# Patient Record
Sex: FEMALE | Race: WHITE
[De-identification: names, ages, dates, MRNs, and addresses within clinical notes are randomized per-mention and may not be internally consistent; named-entity substitution may affect disease eponyms.]

---

## 2018-10-18 ENCOUNTER — HOSPITAL ENCOUNTER (INPATIENT)
Dept: HOSPITAL 76 - ED | Age: 68
LOS: 4 days | Discharge: HOME | DRG: 151 | End: 2018-10-22
Attending: INTERNAL MEDICINE | Admitting: INTERNAL MEDICINE
Payer: MEDICARE

## 2018-10-18 DIAGNOSIS — Z91.138: ICD-10-CM

## 2018-10-18 DIAGNOSIS — Z79.82: ICD-10-CM

## 2018-10-18 DIAGNOSIS — E66.9: ICD-10-CM

## 2018-10-18 DIAGNOSIS — Z79.899: ICD-10-CM

## 2018-10-18 DIAGNOSIS — T46.4X6A: ICD-10-CM

## 2018-10-18 DIAGNOSIS — I10: ICD-10-CM

## 2018-10-18 DIAGNOSIS — R55: ICD-10-CM

## 2018-10-18 DIAGNOSIS — K21.9: ICD-10-CM

## 2018-10-18 DIAGNOSIS — E78.2: ICD-10-CM

## 2018-10-18 DIAGNOSIS — R00.0: ICD-10-CM

## 2018-10-18 DIAGNOSIS — F32.9: ICD-10-CM

## 2018-10-18 DIAGNOSIS — R73.9: ICD-10-CM

## 2018-10-18 DIAGNOSIS — M81.0: ICD-10-CM

## 2018-10-18 DIAGNOSIS — E78.5: ICD-10-CM

## 2018-10-18 DIAGNOSIS — Z87.891: ICD-10-CM

## 2018-10-18 DIAGNOSIS — E87.6: ICD-10-CM

## 2018-10-18 DIAGNOSIS — R04.0: Primary | ICD-10-CM

## 2018-10-18 LAB
ALBUMIN DIAFP-MCNC: 3.5 G/DL (ref 3.2–5.5)
ALBUMIN/GLOB SERPL: 0.9 {RATIO} (ref 1–2.2)
ALP SERPL-CCNC: 97 IU/L (ref 42–121)
ALT SERPL W P-5'-P-CCNC: 21 IU/L (ref 10–60)
ANION GAP SERPL CALCULATED.4IONS-SCNC: 8 MMOL/L (ref 6–13)
AST SERPL W P-5'-P-CCNC: 19 IU/L (ref 10–42)
BASOPHILS NFR BLD AUTO: 0.1 10^3/UL (ref 0–0.1)
BASOPHILS NFR BLD AUTO: 0.1 10^3/UL (ref 0–0.1)
BASOPHILS NFR BLD AUTO: 0.8 %
BASOPHILS NFR BLD AUTO: 0.9 %
BILIRUB BLD-MCNC: 1 MG/DL (ref 0.2–1)
BUN SERPL-MCNC: 30 MG/DL (ref 6–20)
CALCIUM UR-MCNC: 9.3 MG/DL (ref 8.5–10.3)
CHLORIDE SERPL-SCNC: 104 MMOL/L (ref 101–111)
CO2 SERPL-SCNC: 27 MMOL/L (ref 21–32)
CREAT SERPLBLD-SCNC: 0.7 MG/DL (ref 0.4–1)
EOSINOPHIL # BLD AUTO: 0.2 10^3/UL (ref 0–0.7)
EOSINOPHIL # BLD AUTO: 0.2 10^3/UL (ref 0–0.7)
EOSINOPHIL NFR BLD AUTO: 1.4 %
EOSINOPHIL NFR BLD AUTO: 1.7 %
ERYTHROCYTE [DISTWIDTH] IN BLOOD BY AUTOMATED COUNT: 15.3 % (ref 12–15)
ERYTHROCYTE [DISTWIDTH] IN BLOOD BY AUTOMATED COUNT: 15.3 % (ref 12–15)
GFRSERPLBLD MDRD-ARVRAT: 84 ML/MIN/{1.73_M2} (ref 89–?)
GLOBULIN SER-MCNC: 3.8 G/DL (ref 2.1–4.2)
GLUCOSE SERPL-MCNC: 153 MG/DL (ref 70–100)
HGB UR QL STRIP: 12.8 G/DL (ref 12–16)
HGB UR QL STRIP: 13.3 G/DL (ref 12–16)
INR PPP: 1.1 (ref 0.8–1.2)
LIPASE SERPL-CCNC: 23 U/L (ref 22–51)
LYMPHOCYTES # SPEC AUTO: 1.7 10^3/UL (ref 1.5–3.5)
LYMPHOCYTES # SPEC AUTO: 2.5 10^3/UL (ref 1.5–3.5)
LYMPHOCYTES NFR BLD AUTO: 14 %
LYMPHOCYTES NFR BLD AUTO: 18 %
MCH RBC QN AUTO: 28.2 PG (ref 27–31)
MCH RBC QN AUTO: 28.2 PG (ref 27–31)
MCHC RBC AUTO-ENTMCNC: 33.9 G/DL (ref 32–36)
MCHC RBC AUTO-ENTMCNC: 34.3 G/DL (ref 32–36)
MCV RBC AUTO: 82.1 FL (ref 81–99)
MCV RBC AUTO: 83.2 FL (ref 81–99)
MONOCYTES # BLD AUTO: 0.7 10^3/UL (ref 0–1)
MONOCYTES # BLD AUTO: 1.1 10^3/UL (ref 0–1)
MONOCYTES NFR BLD AUTO: 5.5 %
MONOCYTES NFR BLD AUTO: 8.2 %
NEUTROPHILS # BLD AUTO: 10.1 10^3/UL (ref 1.5–6.6)
NEUTROPHILS # BLD AUTO: 9.5 10^3/UL (ref 1.5–6.6)
NEUTROPHILS # SNV AUTO: 12.2 X10^3/UL (ref 4.8–10.8)
NEUTROPHILS # SNV AUTO: 14 X10^3/UL (ref 4.8–10.8)
NEUTROPHILS NFR BLD AUTO: 71.6 %
NEUTROPHILS NFR BLD AUTO: 77.9 %
PDW BLD AUTO: 6.5 FL (ref 7.9–10.8)
PDW BLD AUTO: 6.7 FL (ref 7.9–10.8)
PLATELET # BLD: 263 10^3/UL (ref 130–450)
PLATELET # BLD: 274 10^3/UL (ref 130–450)
PROT SPEC-MCNC: 7.3 G/DL (ref 6.7–8.2)
PROTHROM ACT/NOR PPP: 12.4 SECS (ref 9.9–12.6)
RBC MAR: 4.54 10^6/UL (ref 4.2–5.4)
RBC MAR: 4.72 10^6/UL (ref 4.2–5.4)
SODIUM SERPLBLD-SCNC: 139 MMOL/L (ref 135–145)

## 2018-10-18 PROCEDURE — 83721 ASSAY OF BLOOD LIPOPROTEIN: CPT

## 2018-10-18 PROCEDURE — 83605 ASSAY OF LACTIC ACID: CPT

## 2018-10-18 PROCEDURE — 96365 THER/PROPH/DIAG IV INF INIT: CPT

## 2018-10-18 PROCEDURE — 83036 HEMOGLOBIN GLYCOSYLATED A1C: CPT

## 2018-10-18 PROCEDURE — 80053 COMPREHEN METABOLIC PANEL: CPT

## 2018-10-18 PROCEDURE — 85018 HEMOGLOBIN: CPT

## 2018-10-18 PROCEDURE — 93005 ELECTROCARDIOGRAM TRACING: CPT

## 2018-10-18 PROCEDURE — 85025 COMPLETE CBC W/AUTO DIFF WBC: CPT

## 2018-10-18 PROCEDURE — 99284 EMERGENCY DEPT VISIT MOD MDM: CPT

## 2018-10-18 PROCEDURE — 83880 ASSAY OF NATRIURETIC PEPTIDE: CPT

## 2018-10-18 PROCEDURE — 83735 ASSAY OF MAGNESIUM: CPT

## 2018-10-18 PROCEDURE — 93880 EXTRACRANIAL BILAT STUDY: CPT

## 2018-10-18 PROCEDURE — 83690 ASSAY OF LIPASE: CPT

## 2018-10-18 PROCEDURE — 36415 COLL VENOUS BLD VENIPUNCTURE: CPT

## 2018-10-18 PROCEDURE — 85610 PROTHROMBIN TIME: CPT

## 2018-10-18 PROCEDURE — 85014 HEMATOCRIT: CPT

## 2018-10-18 PROCEDURE — 80061 LIPID PANEL: CPT

## 2018-10-18 PROCEDURE — 85730 THROMBOPLASTIN TIME PARTIAL: CPT

## 2018-10-18 PROCEDURE — 84100 ASSAY OF PHOSPHORUS: CPT

## 2018-10-18 PROCEDURE — 84484 ASSAY OF TROPONIN QUANT: CPT

## 2018-10-18 PROCEDURE — 93306 TTE W/DOPPLER COMPLETE: CPT

## 2018-10-18 PROCEDURE — 96375 TX/PRO/DX INJ NEW DRUG ADDON: CPT

## 2018-10-18 PROCEDURE — 96361 HYDRATE IV INFUSION ADD-ON: CPT

## 2018-10-18 PROCEDURE — 84443 ASSAY THYROID STIM HORMONE: CPT

## 2018-10-18 PROCEDURE — 30901 CONTROL OF NOSEBLEED: CPT

## 2018-10-18 NOTE — ED PHYSICIAN DOCUMENTATION
PD HPI HEENT





- Stated complaint


Stated Complaint: NOSE BLEED





- Chief complaint


Chief Complaint: Heent





- History obtained from


History obtained from: Patient





- History of Present Illness


Timing - onset: Today


Timing - details: Abrupt onset


Location: Nose


Improves: Nothing


Associated symptoms: No: Fever, Trismus, Unable to swallow


Similar symptoms before: No diagnosis


Recently seen: Emergency Dept





- Additional information


Additional information: 


68-year-old female presents the emergency department for evaluation of a 

nosebleed which started earlier today.  The patient has noticed bleeding from 

her left nose and then developed right nose bleeding.  The bleeding had improved

worse to return this evening.  No lightheadedness or dizziness.  No other 

associated symptoms.Symptoms are described as moderate.  No triggering factors. 

No relieving factors








Review of Systems


Constitutional: denies: Fever


Eyes: denies: Discharge


Ears: denies: Ear pain


Nose: reports: Epistaxis


Throat: denies: Sore throat


Cardiac: denies: Chest pain / pressure


Respiratory: denies: Cough


GI: denies: Abdominal Pain


: denies: Dysuria


Musculoskeletal: denies: Neck pain


Neurologic: denies: Generalized weakness


Immunocompromised: denies: Chemotherapy





PD PAST MEDICAL HISTORY





- Past Medical History


Past Medical History: Yes


Cardiovascular: Hypertension, High cholesterol


Respiratory: Asthma


Neuro: None


Endocrine/Autoimmune: None


GI: GERD


GYN: None


: None


HEENT: None


Psych: Depression, Other


Musculoskeletal: Chronic back pain


Derm: Other drug resistant infections





- Past Surgical History


Past Surgical History: Yes


General: Colonoscopy


/GYN: Tubal ligation, Hysterectomy





- Present Medications


Home Medications: 


                                Ambulatory Orders











 Medication  Instructions  Recorded  Confirmed


 


Aspirin 1 tab PO DAILY 10/18/18 10/18/18














- Allergies


Allergies/Adverse Reactions: 


                                    Allergies











Allergy/AdvReac Type Severity Reaction Status Date / Time


 


shrimp Allergy  Unknown Verified 10/19/18 01:35














- Social History


Does the pt smoke?: No


Smoking Status: Never smoker


Does the pt drink ETOH?: Yes


Does the pt have substance abuse?: No





- Immunizations


Immunizations are current?: Yes





- POLST


Patient has POLST: No





PD ED PE NORMAL





- General


General: Alert and oriented X 3, No acute distress





- HEENT


HEENT: Atraumatic, PERRL, EOMI, Ears normal, Other (The patient has a brisk 

bleed out of the left naris which appears to be anterior, there appears to be 

reflux blood into the right naris.  There is blood in the posterior pharynx)





- Cardiac


Cardiac: Other (Regular tachycardia)





- Respiratory


Respiratory: No respiratory distress, Clear bilaterally





- Derm


Derm: Normal color





- Extremities


Extremities: No deformity





- Neuro


Neuro: Alert and oriented X 3, Normal speech





- Psych


Psych: Normal mood





PD ED PE EXPANDED





- HEENT


HEENT: Right nares epsitaxis (The blood in the right nares appears to be reflux 

from the left), Left nares epistaxis





Results





- Vitals


Vitals: 


                               Vital Signs - 24 hr











  10/18/18 10/18/18 10/18/18





  19:22 20:46 22:23


 


Temperature 37.2 C  


 


Heart Rate 125 H 111 H 110 H


 


Respiratory 20 16 22





Rate   


 


Blood Pressure 185/107 H 189/115 H 183/115 H


 


O2 Saturation 96 97 97














  10/18/18 10/18/18





  23:30 23:39


 


Temperature 36.4 C L 


 


Heart Rate 103 H 107 H


 


Respiratory 18 27 H





Rate  


 


Blood Pressure 163/89 H 152/93 H


 


O2 Saturation 96 98








                                     Oxygen











O2 Source                      Room air

















- EKG (time done)


  ** No standard instances


Rate: Rate (enter#) (101)


Rhythm: Sinus tachycardia


Intervals: Normal ID


QRS: Normal


Ischemia: Normal ST segments





- Labs


Labs: 


                                Laboratory Tests











  10/18/18 10/18/18 10/18/18





  19:55 19:55 23:30


 


WBC  12.2 H   14.0 H


 


RBC  4.72   4.54


 


Hgb  13.3   12.8


 


Hct  38.8   37.7


 


MCV  82.1   83.2


 


MCH  28.2   28.2


 


MCHC  34.3   33.9


 


RDW  15.3 H   15.3 H


 


Plt Count  263   274


 


MPV  6.5 L   6.7 L


 


Neut # (Auto)  9.5 H   10.1 H


 


Lymph # (Auto)  1.7   2.5


 


Mono # (Auto)  0.7   1.1 H


 


Eos # (Auto)  0.2   0.2


 


Baso # (Auto)  0.1   0.1


 


Absolute Nucleated RBC  0.00   0.01


 


Nucleated RBC %  0.0   0.0


 


PT   12.4 


 


INR   1.1 


 


APTT   26.7 


 


Sodium   


 


Potassium   


 


Chloride   


 


Carbon Dioxide   


 


Anion Gap   


 


BUN   


 


Creatinine   


 


Estimated GFR (MDRD)   


 


Glucose   


 


Calcium   


 


Total Bilirubin   


 


AST   


 


ALT   


 


Alkaline Phosphatase   


 


Troponin I   


 


Total Protein   


 


Albumin   


 


Globulin   


 


Albumin/Globulin Ratio   


 


Lipase   














  10/18/18 10/18/18





  23:30 23:30


 


WBC  


 


RBC  


 


Hgb  


 


Hct  


 


MCV  


 


MCH  


 


MCHC  


 


RDW  


 


Plt Count  


 


MPV  


 


Neut # (Auto)  


 


Lymph # (Auto)  


 


Mono # (Auto)  


 


Eos # (Auto)  


 


Baso # (Auto)  


 


Absolute Nucleated RBC  


 


Nucleated RBC %  


 


PT  


 


INR  


 


APTT  


 


Sodium  139 


 


Potassium  3.2 L 


 


Chloride  104 


 


Carbon Dioxide  27 


 


Anion Gap  8.0 


 


BUN  30 H 


 


Creatinine  0.7 


 


Estimated GFR (MDRD)  84 L 


 


Glucose  153 H 


 


Calcium  9.3 


 


Total Bilirubin  1.0 


 


AST  19 


 


ALT  21 


 


Alkaline Phosphatase  97 


 


Troponin I   < 0.04


 


Total Protein  7.3 


 


Albumin  3.5 


 


Globulin  3.8 


 


Albumin/Globulin Ratio  0.9 L 


 


Lipase  23 














Procedures





- Epistaxis


Site: Both


Preparation: Clots removed, Afrin, Clamp / pressure applied


Treatment: Anterior rhinorocket, Packing inserted


Other: Pt tolerated well





PD MEDICAL DECISION MAKING





- ED course


ED course: 


The patient's nose was packed initially with a rapid Rhino, the patient 

continued to bleed despite the initial rapid Rhino.  TXA was then applied to 

both naris and observed for 20 minutes.  The nose was then packed with 2 rapid 

Rhino was in each naris.  The patient's bleeding had stabilized.





I was called to the patient's bedside.  The patient appeared to have a syncopal 

episode.  Prior to the syncopal episode the patient was feeling very 

lightheaded, nauseous.  The patient unfortunately syncopized and had a loss of 

bladder.  The patient denies any chest pain or palpitations during the event.  

Lab work was rechecked in addition to an EKG.  It appears that the patient most 

likely had a vagal episode from swallowing blood from the nosebleed.  Given the 

complicated course the patient will benefit from observation overnight in the 

hospital.  The findings and plan were discussed the patient who understands and 

agrees to the plan.





The case was discussed with the hospitalist who accepts the patient onto his 

service.








Departure





- Departure


Disposition: ED Place in Observation


Clinical Impression: 


 Epistaxis





Syncope


Qualifiers:


 Syncope type: unspecified Qualified Code(s): R55 - Syncope and collapse





Condition: Good


Discharge Date/Time: 10/19/18 01:10

## 2018-10-19 LAB
ALBUMIN DIAFP-MCNC: 3.1 G/DL (ref 3.2–5.5)
ALBUMIN/GLOB SERPL: 0.9 {RATIO} (ref 1–2.2)
ALP SERPL-CCNC: 92 IU/L (ref 42–121)
ALT SERPL W P-5'-P-CCNC: 19 IU/L (ref 10–60)
ANION GAP SERPL CALCULATED.4IONS-SCNC: 7 MMOL/L (ref 6–13)
AST SERPL W P-5'-P-CCNC: 16 IU/L (ref 10–42)
BASOPHILS NFR BLD AUTO: 0.1 10^3/UL (ref 0–0.1)
BASOPHILS NFR BLD AUTO: 1 %
BILIRUB BLD-MCNC: 0.4 MG/DL (ref 0.2–1)
BUN SERPL-MCNC: 29 MG/DL (ref 6–20)
CALCIUM UR-MCNC: 8.7 MG/DL (ref 8.5–10.3)
CHLORIDE SERPL-SCNC: 109 MMOL/L (ref 101–111)
CO2 SERPL-SCNC: 25 MMOL/L (ref 21–32)
CREAT SERPLBLD-SCNC: 0.6 MG/DL (ref 0.4–1)
EOSINOPHIL # BLD AUTO: 0.1 10^3/UL (ref 0–0.7)
EOSINOPHIL NFR BLD AUTO: 0.5 %
ERYTHROCYTE [DISTWIDTH] IN BLOOD BY AUTOMATED COUNT: 15.3 % (ref 12–15)
EST. AVERAGE GLUCOSE BLD GHB EST-MCNC: 114 MG/DL (ref 70–100)
GFRSERPLBLD MDRD-ARVRAT: 100 ML/MIN/{1.73_M2} (ref 89–?)
GLOBULIN SER-MCNC: 3.4 G/DL (ref 2.1–4.2)
GLUCOSE SERPL-MCNC: 126 MG/DL (ref 70–100)
HB2 TOTAL: 11.4 G/DL
HBA1C BLD-MCNC: 0.43 G/DL
HEMOGLOBIN A1C %: 5.6 % (ref 4.6–6.2)
HGB UR QL STRIP: 11.1 G/DL (ref 12–16)
HGB UR QL STRIP: 11.4 G/DL (ref 12–16)
INR PPP: 1.2 (ref 0.8–1.2)
LYMPHOCYTES # SPEC AUTO: 1.7 10^3/UL (ref 1.5–3.5)
LYMPHOCYTES NFR BLD AUTO: 15 %
MAGNESIUM SERPL-MCNC: 1.8 MG/DL (ref 1.7–2.8)
MCH RBC QN AUTO: 27.8 PG (ref 27–31)
MCHC RBC AUTO-ENTMCNC: 33.3 G/DL (ref 32–36)
MCV RBC AUTO: 83.7 FL (ref 81–99)
MONOCYTES # BLD AUTO: 0.8 10^3/UL (ref 0–1)
MONOCYTES NFR BLD AUTO: 7.1 %
NEUTROPHILS # BLD AUTO: 8.7 10^3/UL (ref 1.5–6.6)
NEUTROPHILS # SNV AUTO: 11.4 X10^3/UL (ref 4.8–10.8)
NEUTROPHILS NFR BLD AUTO: 76.4 %
PDW BLD AUTO: 6.6 FL (ref 7.9–10.8)
PHOSPHATE BLD-MCNC: 3.3 MG/DL (ref 2.5–4.6)
PLATELET # BLD: 226 10^3/UL (ref 130–450)
PROT SPEC-MCNC: 6.5 G/DL (ref 6.7–8.2)
PROTHROM ACT/NOR PPP: 13 SECS (ref 9.9–12.6)
RBC MAR: 4 10^6/UL (ref 4.2–5.4)
SODIUM SERPLBLD-SCNC: 141 MMOL/L (ref 135–145)

## 2018-10-19 RX ADMIN — SODIUM CHLORIDE, PRESERVATIVE FREE SCH: 5 INJECTION INTRAVENOUS at 09:43

## 2018-10-19 RX ADMIN — FAMOTIDINE SCH MG: 20 TABLET, FILM COATED ORAL at 09:43

## 2018-10-19 RX ADMIN — FAMOTIDINE SCH MG: 20 TABLET, FILM COATED ORAL at 20:54

## 2018-10-19 RX ADMIN — ATORVASTATIN CALCIUM SCH MG: 10 TABLET, FILM COATED ORAL at 09:42

## 2018-10-19 RX ADMIN — FERROUS GLUCONATE SCH MG: 324 TABLET ORAL at 20:53

## 2018-10-19 RX ADMIN — POLYETHYLENE GLYCOL 3350 SCH GM: 17 POWDER, FOR SOLUTION ORAL at 09:43

## 2018-10-19 RX ADMIN — LISINOPRIL SCH MG: 20 TABLET ORAL at 09:41

## 2018-10-19 RX ADMIN — SODIUM CHLORIDE, PRESERVATIVE FREE SCH ML: 5 INJECTION INTRAVENOUS at 01:35

## 2018-10-19 RX ADMIN — ACETAMINOPHEN PRN MG: 325 TABLET ORAL at 16:03

## 2018-10-19 RX ADMIN — OXYCODONE PRN MG: 5 TABLET ORAL at 22:41

## 2018-10-19 RX ADMIN — SODIUM CHLORIDE, PRESERVATIVE FREE SCH: 5 INJECTION INTRAVENOUS at 16:04

## 2018-10-19 NOTE — PROVIDER PROGRESS NOTE
Assessment/Plan





- Problem List


(1) Epistaxis


Assessment/Plan: 


The R nares has an anterior pack. The left nares has a posterior pack (per her 

RN checking what her procedure was in the ER).


Since a posterior pack has chance of complications like sinusitis and may need 

CT imaging, she will need to stay an additional 2 days.


Will change her to inpatient status.


Monitor packs for bleeding.


Plan to remove packs in 3-4 days.


Monitor CBC this afternoon, then q12-24 h.








(2) Syncope


Qualifiers: 


   Syncope type: unspecified   Qualified Code(s): R55 - Syncope and collapse   


Assessment/Plan: 


Echo showed normal LVEF and PA pressure.


Carotid Doppler showed mild-moderate plaque.


Telemetry showed no arrhythmias.


Will check orthostatic VS.


Most likely is a vasovagal syncope, as the description was of vagal prodrome 

(nauseated, gillian








(3) Hypertension


Qualifiers: 


   Hypertension type: essential hypertension   Qualified Code(s): I10 - 

Essential (primary) hypertension   


Assessment/Plan: 


Continue Lisinopril while here, unless she is orthostatic during postural VS 

checks.








(4) Depression


Qualifiers: 


   Depression Type: unspecified   Qualified Code(s): F32.9 - Major depressive 

disorder, single episode, unspecified   


Assessment/Plan: 


Continue Trazadone while here








(5) Hyperlipidemia


Qualifiers: 


   Hyperlipidemia type: unspecified   Qualified Code(s): E78.5 - Hyperlipidemia,

unspecified   


Assessment/Plan: 


Continue statin while here








- Current Meds


Current Meds: 





                               Current Medications











Generic Name Dose Route Start Last Admin





  Trade Name Freq  PRN Reason Stop Dose Admin


 


Atorvastatin Calcium  10 mg  10/19/18 09:00  10/19/18 09:42





  Lipitor  PO   10 mg





  DAILY EUGENIO   Administration





     





     





     





     


 


Famotidine  20 mg  10/19/18 09:00  10/19/18 09:43





  Pepcid  PO   20 mg





  BID EUGENIO   Administration





     





     





     





     


 


Sodium Chloride  500 mls @ 30 mls/hr  10/19/18 11:37  10/19/18 14:58





  Normal Saline 0.9%  IV   Not Given





  .[id number] EUGENIO   





     





     





     





     


 


Lisinopril  40 mg  10/19/18 09:00  10/19/18 09:41





  Zestril  PO   40 mg





  DAILY EUGENIO   Administration





     





     





     





     


 


Polyethylene Glycol  17 gm  10/19/18 09:00  10/19/18 09:43





  Miralax  PO   17 gm





  DAILY EUGENIO   Administration





     





     





     





     


 


Sodium Chloride  10 ml  10/19/18 01:00  10/19/18 09:43





  Normal Saline Flush 0.9%  IVP   Not Given





  0100,0900,1700 EUGENIO   





     





     





     





     


 


Trazodone HCl  100 mg  10/19/18 04:00  10/19/18 03:34





  Desyrel  PO   Not Given





  HS EUGENIO   





     





     





     





     














- Lab Result


Fish Bone Diagrams: 


                                 10/19/18 15:03





                                 10/19/18 05:35





- Additional Planning


My Orders: 





My Active Orders





10/19/18 11:37


Sodium Chloride 0.9% [Normal Saline 0.9%] 500 ml IV 30 mls/hr 





10/19/18 11:39


Postural [Vital Signs - Orthostatic] [RC] QSHIFT 














Subjective





- Subjective


Patient Reports: Feeling Better, Other (Eyes are tearing)





Objective


Vital Signs: 





                               Vital Signs - 24 hr











  10/18/18 10/18/18 10/18/18





  19:22 20:46 22:23


 


Temperature 37.2 C  


 


Heart Rate 125 H 111 H 110 H


 


Heart Rate [   





Brachial]   


 


Heart Rate [   





Sitting (After   





1 Minute)]   


 


Heart Rate [   





Standing (After   





1 Minute)]   


 


Heart Rate [   





Supine]   


 


Respiratory 20 16 22





Rate   


 


Blood Pressure 185/107 H 189/115 H 183/115 H


 


Blood Pressure   





[Left Brachial   





artery]   


 


Blood Pressure   





[Sitting (After   





1 Minute)]   


 


Blood Pressure   





[Standing (   





After 1 Minute)   





]   


 


Blood Pressure   





[Supine]   


 


O2 Saturation 96 97 97














  10/18/18 10/18/18 10/19/18





  23:30 23:39 00:18


 


Temperature 36.4 C L  


 


Heart Rate 103 H 107 H 103 H


 


Heart Rate [   





Brachial]   


 


Heart Rate [   





Sitting (After   





1 Minute)]   


 


Heart Rate [   





Standing (After   





1 Minute)]   


 


Heart Rate [   





Supine]   


 


Respiratory 18 27 H 17





Rate   


 


Blood Pressure 163/89 H 152/93 H 168/88 H


 


Blood Pressure   





[Left Brachial   





artery]   


 


Blood Pressure   





[Sitting (After   





1 Minute)]   


 


Blood Pressure   





[Standing (   





After 1 Minute)   





]   


 


Blood Pressure   





[Supine]   


 


O2 Saturation 96 98 95














  10/19/18 10/19/18 10/19/18





  01:05 01:25 04:47


 


Temperature  36.7 C 36.7 C


 


Heart Rate 109 H  


 


Heart Rate [  107 H 102 H





Brachial]   


 


Heart Rate [   





Sitting (After   





1 Minute)]   


 


Heart Rate [   





Standing (After   





1 Minute)]   


 


Heart Rate [   





Supine]   


 


Respiratory 12 18 18





Rate   


 


Blood Pressure 157/87 H  


 


Blood Pressure  131/85 H 144/85 H





[Left Brachial   





artery]   


 


Blood Pressure   





[Sitting (After   





1 Minute)]   


 


Blood Pressure   





[Standing (   





After 1 Minute)   





]   


 


Blood Pressure   





[Supine]   


 


O2 Saturation 94 96 95














  10/19/18 10/19/18 10/19/18





  08:00 13:21 13:36


 


Temperature 37.2 C 37.1 C 


 


Heart Rate   


 


Heart Rate [ 116 H 118 H 





Brachial]   


 


Heart Rate [   118 H





Sitting (After   





1 Minute)]   


 


Heart Rate [   120 H





Standing (After   





1 Minute)]   


 


Heart Rate [   112 H





Supine]   


 


Respiratory 20 19 





Rate   


 


Blood Pressure   


 


Blood Pressure 141/75 H 144/69 H 





[Left Brachial   





artery]   


 


Blood Pressure   151/88 H





[Sitting (After   





1 Minute)]   


 


Blood Pressure   163/90 H





[Standing (   





After 1 Minute)   





]   


 


Blood Pressure   150/76 H





[Supine]   


 


O2 Saturation 94 97 














  10/19/18





  15:34


 


Temperature 


 


Heart Rate 


 


Heart Rate [ 111 H





Brachial] 


 


Heart Rate [ 





Sitting (After 





1 Minute)] 


 


Heart Rate [ 





Standing (After 





1 Minute)] 


 


Heart Rate [ 





Supine] 


 


Respiratory 20





Rate 


 


Blood Pressure 


 


Blood Pressure 145/81 H





[Left Brachial 





artery] 


 


Blood Pressure 





[Sitting (After 





1 Minute)] 


 


Blood Pressure 





[Standing ( 





After 1 Minute) 





] 


 


Blood Pressure 





[Supine] 


 


O2 Saturation 93








                                     Oxygen











O2 Source                      Room air














I&O (Last 24 Hrs): 





                          Intake and Output Totals x24h











 10/17/18 10/18/18 10/19/18





 23:59 23:59 23:59


 


Intake Total  110 2400


 


Balance  110 2400











General: Alert


HEENT: Other (Bilateral nares have packs)


Neck: Supple, No JVD


Neuro: Non Focal


Cardiovascular: Regular rate


Respiratory: No respiratory distress


Abdomen: Soft


Extremities: No edema





- Results


Results: 





                               Laboratory Results











WBC  11.4 x10^3/uL (4.8-10.8)  H  10/19/18  05:35    


 


RBC  4.00 10^6/uL (4.20-5.40)  L  10/19/18  05:35    


 


Hgb  11.4 g/dL (12.0-16.0)  L  10/19/18  15:03    


 


Hct  34.0 % (37.0-47.0)  L  10/19/18  15:03    


 


MCV  83.7 fL (81.0-99.0)   10/19/18  05:35    


 


MCH  27.8 pg (27.0-31.0)   10/19/18  05:35    


 


MCHC  33.3 g/dL (32.0-36.0)   10/19/18  05:35    


 


RDW  15.3 % (12.0-15.0)  H  10/19/18  05:35    


 


Plt Count  226 10^3/uL (130-450)   10/19/18  05:35    


 


MPV  6.6 fL (7.9-10.8)  L  10/19/18  05:35    


 


Neut # (Auto)  8.7 10^3/uL (1.5-6.6)  H  10/19/18  05:35    


 


Lymph # (Auto)  1.7 10^3/uL (1.5-3.5)   10/19/18  05:35    


 


Mono # (Auto)  0.8 10^3/uL (0.0-1.0)   10/19/18  05:35    


 


Eos # (Auto)  0.1 10^3/uL (0.0-0.7)   10/19/18  05:35    


 


Baso # (Auto)  0.1 10^3/uL (0.0-0.1)   10/19/18  05:35    


 


Absolute Nucleated RBC  0.00 x10^3/uL  10/19/18  05:35    


 


Nucleated RBC %  0.0 /100WBC  10/19/18  05:35    


 


PT  13.0 secs (9.9-12.6)  H  10/19/18  05:35    


 


INR  1.2  (0.8-1.2)   10/19/18  05:35    


 


APTT  26.7 secs (24.9-33.3)   10/18/18  19:55    


 


Sodium  141 mmol/L (135-145)   10/19/18  05:35    


 


Potassium  3.6 mmol/L (3.5-5.0)   10/19/18  05:35    


 


Chloride  109 mmol/L (101-111)   10/19/18  05:35    


 


Carbon Dioxide  25 mmol/L (21-32)   10/19/18  05:35    


 


Anion Gap  7.0  (6-13)   10/19/18  05:35    


 


BUN  29 mg/dL (6-20)  H  10/19/18  05:35    


 


Creatinine  0.6 mg/dL (0.4-1.0)   10/19/18  05:35    


 


Estimated GFR (MDRD)  100  (>89)   10/19/18  05:35    


 


Glucose  126 mg/dL ()  H  10/19/18  05:35    


 


Glycated Hemoglobin  5.6 % (4.6-6.2)   10/19/18  05:35    


 


Estim Average Glucose  114  ()  H  10/19/18  05:35    


 


Lactic Acid  0.7 mmol/L (0.5-2.2)   10/19/18  05:35    


 


Calcium  8.7 mg/dL (8.5-10.3)   10/19/18  05:35    


 


Phosphorus  3.3 mg/dL (2.5-4.6)   10/19/18  05:35    


 


Magnesium  1.8 mg/dL (1.7-2.8)   10/19/18  05:35    


 


Total Bilirubin  0.4 mg/dL (0.2-1.0)   10/19/18  05:35    


 


AST  16 IU/L (10-42)   10/19/18  05:35    


 


ALT  19 IU/L (10-60)   10/19/18  05:35    


 


Alkaline Phosphatase  92 IU/L ()   10/19/18  05:35    


 


Troponin I  < 0.04 ng/mL (<0.49)   10/19/18  05:35    


 


B-Natriuretic Peptide  16 pg/mL (5-100)   10/19/18  05:35    


 


Total Protein  6.5 g/dL (6.7-8.2)  L  10/19/18  05:35    


 


Albumin  3.1 g/dL (3.2-5.5)  L  10/19/18  05:35    


 


Globulin  3.4 g/dL (2.1-4.2)   10/19/18  05:35    


 


Albumin/Globulin Ratio  0.9  (1.0-2.2)  L  10/19/18  05:35    


 


Lipase  23 U/L (22-51)   10/18/18  23:30

## 2018-10-19 NOTE — HISTORY & PHYSICAL EXAMINATION
Chief Complaint





- Chief Complaint


Chief Complaint: Nose bleed





History of Present Illness





- Admitted From


Admitted From:: Emergency Department





- History Obtained From


Records Reviewed: Yes


History obtained from: Patient and medical records


Exam Limitations: None





- History of Present Illness


HPI Comment/Other: 





Patient is a very pleasant 68-year-old female with a past medical history 

significant for hypertension, hyperlipidemia, GERD, chronic joint pain, 

osteoarthritis and osteoporosis who presented to the emergency department with a

chief complaint nosebleed.  Patient states that she was in her normal state of 

health until this afternoon around 3 PM when she states that she had a sudden 

onset of a nosebleed.  She states that she has had several nosebleeds in the 

past and once before it was severe enough that it caused her to go to the 

emergency department and get packing.  She states that there was a constant flow

of blood coming from her nose mixed in with clots.  She states that it continued

for nearly 4 hours before she came to the emergency department.  She states that

her  came home around 4 PM and at that time they called the on-call nurse

for  and they had told her that they would get back to her in 20 minutes 

however it took about an hour and 20 minutes for them to get back to her.  She 

states that at around 5 PM it seemed like it may have finally been slowing down 

as there were some small periods where the bleeding stopped.  But she states 

that it then started back up and was again persistent.  She states that when 

they talk to the  nurse she told her to come to the emergency department.

 When the bleeding continued they finally decided to come to the emergency.  The

patient denies any headaches, focal neurologic deficits or trauma to her head or

nose.  The patient states that she takes aspirin but no other blood thinners.  

She states that she is never been diagnosed with a blood disorder.  She denies 

any nasal congestion or any other upper respiratory symptoms.  She denies 

picking her nose.





Patient denies any sore throat, difficulty swallowing, fevers, chills, tinnitus,

hearing loss, chest pain, shortness of air, orthopnea, PND, increased lower 

extremity swelling, abdominal pain, nausea, vomiting, diarrhea, constipation, 

joint pain, muscle aches, joint swelling, neck stiffness, recent unintentional 

weight loss, changes in her appetite, polyuria, polydipsia, skin changes, skin 

rash, petechiae, night sweats or any focal neurologic deficits.





On presentation to the emergency department the patient was afebrile and 

tachycardic with a heart rate of 125.  She was hypertensive with a blood 

pressure of 185/107 but was not otherwise in any respiratory distress.  The 

patient was having significant epistaxis and was initially given tranexamic acid

and Afrin in the emergency department.  The patient did not respond to either 

and therefore was also given cocaine.  Although the patient's epistaxis did 

appear to slow down it did not completely resolve therefore the patient had 

rhino packs placed in her nasal passages.  With this the patient's bleeding ap

peared to slow down.  While the patient was sitting in the emergency room she 

began to feel nauseated and became diaphoretic.  The patient's  got her a

trash can into which the patient spat out blood and then the  describes 

the patient's head tilted back and she became unresponsive.  When the nurse 

arrived to the patient's bedside she did a sternal rub and patient did not wake 

up but she was obviously breathing with snoring.  She had a pulse.  The patient 

slowly woke up over about a minute to a minute and a half.  The patient appeared

to have had a syncopal episode likely vasovagal.  The patient underwent routine 

lab work which revealed a mild hypokalemia, and elevated blood glucose of 153 

and a negative troponin.  The patient's hemoglobin was initially 13.3 and then 

12.2.  The patient did have a mild leukocytosis.  The patient's INR was normal. 

Given the patient's syncopal episode the patient was placed in observation for 

further monitoring to ensure that her hemoglobin does not drop further and that 

she does not have any further episodes of syncope.  She will also get a workup 

for syncope.





History





- Past Medical History


Cardiovascular: reports: Hypertension, High cholesterol


Respiratory: reports: Asthma


Neuro: reports: None


Endocrine/Autoimmune: reports: None


GI: reports: GERD


GYN: reports: None


: reports: None


HEENT: reports: None


Psych: reports: Depression, Other


Musculoskeletal: reports: Osteoarthritis, Osteoporosis, Chronic back pain


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


MRSA Hx?: No





- Past Surgical History


General: reports: Colonoscopy


/GYN: reports: Tubal ligation, Hysterectomy





- Family & Social History


Family History: Mother: , COPD/Emphysema, Father: , Alcoholism, 

Other family: Diabetes, Type 2 (Grandmother)


Family History Comment/Other: Father and half sister had rheumatoid arthritis


Living arrangement: At home


Living Situation: With spouse/s.o.


Social History Notes: The patient currently lives in [place], [place] with

her .  They have been living on [facility] for the past 30 years.  

The patient is originally from [place], [place].  She has 3 children.  She 

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  She has been 

 to her  for 48 years.  Her  is going to be retiring in 

January and she and her  are going to be moving back to [place] at the 

beginning of this next year for their intermediate.  The patient was a former 

smoker smoked about a pack a day for over 30 years and quit 10 years ago.  She 

drinks occasionally and denies any illicit drug use.





- POLST


Patient has POLST: No


POLST Status: Full Code





Meds/Allgy





- Home Medications


Home Medications: 


                                Ambulatory Orders











 Medication  Instructions  Recorded  Confirmed


 


Aspirin 1 tab PO DAILY 10/18/18 10/18/18


 


Ergocalciferol [Vitamin D2] 50,000 unit PO Q7D 10/19/18 10/19/18


 


Lisinopril 40 mg PO DAILY 10/19/18 10/19/18


 


Omeprazole 20 mg PO DAILY 10/19/18 10/19/18


 


Simvastatin 20 mg PO DAILY 10/19/18 10/19/18


 


traZODone [Desyrel] 100 mg PO HS 10/19/18 10/19/18














- Allergies


Allergies/Adverse Reactions: 


                                    Allergies











Allergy/AdvReac Type Severity Reaction Status Date / Time


 


shrimp Allergy  Unknown Verified 10/19/18 01:35














Review of Systems





- Other Findings


Other Findings: 





A comprehensive review of systems was performed the pertinent positives and 

negatives are stated above in the HPI and the remainder of the review of systems

 is negative.


Prior Level of Functionality: 





Able to perform all her activities of daily living independently





Exam





- Vital Signs


Reviewed Vital Signs: Yes


Vital Signs: 





                                Vital Signs x48h











  Temp Pulse Resp BP Pulse Ox


 


 10/18/18 23:39   107 H  27 H  152/93 H  98


 


 10/18/18 23:30  36.4 C L  103 H  18  163/89 H  96


 


 10/18/18 22:23   110 H  22  183/115 H  97


 


 10/18/18 20:46   111 H  16  189/115 H  97


 


 10/18/18 19:22  37.2 C  125 H  20  185/107 H  96














- Physical Exam


General Appearance: positive: No acute distress, Alert


Eyes Bilateral: positive: Normal inspection, PERRL, EOMI, No lid inflammation, 

Conjunctivae nml, No scleral icterus


ENT: positive: Pharynx nml, No signs of dehydration, Other (Both patient's 

nostrils are packed with Rhino packing and mildly saturated with blood)


Neck: positive: Nml inspection, Thyroid nml, No JVD, Trachea midline.  negative:

 Lymphadenopathy (R), Lymphadenopathy (L), Stiff neck, Carotid bruit, Tracheal 

deviation


Respiratory: positive: Chest non-tender, No respiratory distress, Breath sounds 

nml.  negative: Wheezes, Rales, Rhonchi


Cardiovascular: positive: No murmur, No gallop, Tachycardia


Peripheral Pulses: positive: 2+


Abdomen: positive: Non-tender, No organomegaly, Nml bowel sounds, No distention.

  negative: Guarding, Rebound, Hepatomegaly


Back: positive: Nml inspection.  negative: CVA tenderness (R), CVA tenderness 

(L)


Skin: positive: Color nml, No rash, Warm, Dry.  negative: Cyanosis, Pallor, Skin

 rash


Extremities: positive: Non-tender, Full ROM, Nml appearance, No pedal edema


Neurologic/Psychiatric: positive: Oriented x3, CN's nml (2-12), Motor nml, 

Sensation nml, Mood/affect nml





Conclusion/Plan





- Problem List


(1) Syncope


Conclusion/Plan: 





The patient had a syncopal episode in the emergency department after presenting 

with a nosebleed.  It is likely given the description of the syncopal episode 

that the patient had a vasovagal syncope.  However given her age and risk 

factors it was felt appropriate to place the patient in observation for 

monitoring and further workup.





Plan:


Monitor in observation


Telemetry monitoring


IV fluids


Carotid Doppler


Echocardiogram


Monitor hemoglobin


Qualifiers: 


   Syncope type: unspecified   Qualified Code(s): R55 - Syncope and collapse   





(2) Epistaxis


Conclusion/Plan: 





The patient presented with severe epistaxis that was going on for nearly 4 hours

 prior to coming to the emergency department.  The patient required tranexamic 

acid, Afrin, cocaine and finally a rhino packing to get the bleeding controlled.

  This is not the patient's first episode of epistaxis she has had a few 

episodes in the past including one that caused her to come to the emergency 

department.





Plan:


Continue rhino packing for 3-4 days


Patient will need to follow-up with ENT given the recurrent nosebleeds.


Monitor hemoglobin








(3) Hypertension


Conclusion/Plan: 


The patient has a history of hypertension but had run out of her lisinopril a 

few weeks ago and has not refilled it.  On presentation to the emergency 

department the patient was very hypertensive with a blood pressure of 185/107.  

The association between hypertension and nosebleeds is uncertain even though the

 patient felt that her nosebleed was due to her blood pressure being 

uncontrolled.  The patient will be restarted on her home dose of lisinopril and 

will continue to monitor her blood pressure and titrate medication as needed.


Qualifiers: 


   Hypertension type: essential hypertension   Qualified Code(s): I10 - 

Essential (primary) hypertension   





(4) Hyperglycemia


Conclusion/Plan: 


The patient denies any history of diabetes but on presentation to the emergency 

department her blood glucose is elevated at 153.  We will check a hemoglobin A1c

 in the morning and monitor the patient's blood glucose daily.  If the patient's

 A1c is elevated we will consider starting treatment for diabetes.








(5) Hyperlipidemia


Conclusion/Plan: 


The patient has a history of hyperlipidemia and takes a statin at home.  We will

 continue her on statin while she is hospitalized here.


Qualifiers: 


   Hyperlipidemia type: unspecified   Qualified Code(s): E78.5 - Hyperlipidemia,

 unspecified   





(6) Depression


Conclusion/Plan: 


The patient has a history of depression and takes trazodone at home.  The 

patient will be continued on her home dose of trazodone while she is 

hospitalized.  Currently the patient's mood appears to be stable.


Qualifiers: 


   Depression Type: unspecified   Qualified Code(s): F32.9 - Major depressive 

disorder, single episode, unspecified   





- Lab Results


Lab results reviewed: Yes


Fish Bones: 


                                 10/18/18 23:30





                                 10/18/18 23:30


Other Lab Results: 








                               Laboratory Results











WBC  14.0 x10^3/uL (4.8-10.8)  H  10/18/18  23:30    


 


RBC  4.54 10^6/uL (4.20-5.40)   10/18/18  23:30    


 


Hgb  12.8 g/dL (12.0-16.0)   10/18/18  23:30    


 


Hct  37.7 % (37.0-47.0)   10/18/18  23:30    


 


MCV  83.2 fL (81.0-99.0)   10/18/18  23:30    


 


MCH  28.2 pg (27.0-31.0)   10/18/18  23:30    


 


MCHC  33.9 g/dL (32.0-36.0)   10/18/18  23:30    


 


RDW  15.3 % (12.0-15.0)  H  10/18/18  23:30    


 


Plt Count  274 10^3/uL (130-450)   10/18/18  23:30    


 


MPV  6.7 fL (7.9-10.8)  L  10/18/18  23:30    


 


Neut # (Auto)  10.1 10^3/uL (1.5-6.6)  H  10/18/18  23:30    


 


Lymph # (Auto)  2.5 10^3/uL (1.5-3.5)   10/18/18  23:30    


 


Mono # (Auto)  1.1 10^3/uL (0.0-1.0)  H  10/18/18  23:30    


 


Eos # (Auto)  0.2 10^3/uL (0.0-0.7)   10/18/18  23:30    


 


Baso # (Auto)  0.1 10^3/uL (0.0-0.1)   10/18/18  23:30    


 


Absolute Nucleated RBC  0.01 x10^3/uL  10/18/18  23:30    


 


Nucleated RBC %  0.0 /100WBC  10/18/18  23:30    


 


PT  12.4 secs (9.9-12.6)   10/18/18  19:55    


 


INR  1.1  (0.8-1.2)   10/18/18  19:55    


 


APTT  26.7 secs (24.9-33.3)   10/18/18  19:55    


 


Sodium  139 mmol/L (135-145)   10/18/18  23:30    


 


Potassium  3.2 mmol/L (3.5-5.0)  L  10/18/18  23:30    


 


Chloride  104 mmol/L (101-111)   10/18/18  23:30    


 


Carbon Dioxide  27 mmol/L (21-32)   10/18/18  23:30    


 


Anion Gap  8.0  (6-13)   10/18/18  23:30    


 


BUN  30 mg/dL (6-20)  H  10/18/18  23:30    


 


Creatinine  0.7 mg/dL (0.4-1.0)   10/18/18  23:30    


 


Estimated GFR (MDRD)  84  (>89)  L  10/18/18  23:30    


 


Glucose  153 mg/dL ()  H  10/18/18  23:30    


 


Calcium  9.3 mg/dL (8.5-10.3)   10/18/18  23:30    


 


Total Bilirubin  1.0 mg/dL (0.2-1.0)   10/18/18  23:30    


 


AST  19 IU/L (10-42)   10/18/18  23:30    


 


ALT  21 IU/L (10-60)   10/18/18  23:30    


 


Alkaline Phosphatase  97 IU/L ()   10/18/18  23:30    


 


Troponin I  < 0.04 ng/mL (<0.49)   10/18/18  23:30    


 


Total Protein  7.3 g/dL (6.7-8.2)   10/18/18  23:30    


 


Albumin  3.5 g/dL (3.2-5.5)   10/18/18  23:30    


 


Globulin  3.8 g/dL (2.1-4.2)   10/18/18  23:30    


 


Albumin/Globulin Ratio  0.9  (1.0-2.2)  L  10/18/18  23:30    


 


Lipase  23 U/L (22-51)   10/18/18  23:30    














- EKG Results


EKG Interpreted Independently: Yes


EKG Findings: 





EKG showed sinus tachycardia with no ST elevations or ischemic changes





Core Measures





- Anticipated LOS


I expect patient to be DC'd or transferred within 96 hours.: Yes





- DVT/VTE - Prophylaxis


VTE/DVT Device ordered at admit?: Yes

## 2018-10-20 LAB
BASOPHILS NFR BLD AUTO: 0.1 10^3/UL (ref 0–0.1)
BASOPHILS NFR BLD AUTO: 0.5 %
CHOLEST SERPL-MCNC: 169 MG/DL
EOSINOPHIL # BLD AUTO: 0.4 10^3/UL (ref 0–0.7)
EOSINOPHIL NFR BLD AUTO: 3.9 %
ERYTHROCYTE [DISTWIDTH] IN BLOOD BY AUTOMATED COUNT: 15.4 % (ref 12–15)
HDLC SERPL-MCNC: 32 MG/DL
HDLC SERPL: 5.3 {RATIO} (ref ?–4.4)
HGB UR QL STRIP: 10.7 G/DL (ref 12–16)
LDLC SERPL CALC-MCNC: 102 MG/DL
LDLC/HDLC SERPL: 3.2 {RATIO} (ref ?–4.4)
LYMPHOCYTES # SPEC AUTO: 2.4 10^3/UL (ref 1.5–3.5)
LYMPHOCYTES NFR BLD AUTO: 20.8 %
MCH RBC QN AUTO: 28.1 PG (ref 27–31)
MCHC RBC AUTO-ENTMCNC: 33.2 G/DL (ref 32–36)
MCV RBC AUTO: 84.7 FL (ref 81–99)
MONOCYTES # BLD AUTO: 0.8 10^3/UL (ref 0–1)
MONOCYTES NFR BLD AUTO: 7.4 %
NEUTROPHILS # BLD AUTO: 7.7 10^3/UL (ref 1.5–6.6)
NEUTROPHILS # SNV AUTO: 11.4 X10^3/UL (ref 4.8–10.8)
NEUTROPHILS NFR BLD AUTO: 67.4 %
PDW BLD AUTO: 7 FL (ref 7.9–10.8)
PLATELET # BLD: 207 10^3/UL (ref 130–450)
RBC MAR: 3.79 10^6/UL (ref 4.2–5.4)
VLDLC SERPL-SCNC: 35 MG/DL

## 2018-10-20 RX ADMIN — SODIUM CHLORIDE, PRESERVATIVE FREE SCH: 5 INJECTION INTRAVENOUS at 00:37

## 2018-10-20 RX ADMIN — FERROUS GLUCONATE SCH MG: 324 TABLET ORAL at 19:59

## 2018-10-20 RX ADMIN — ATORVASTATIN CALCIUM SCH MG: 10 TABLET, FILM COATED ORAL at 08:52

## 2018-10-20 RX ADMIN — FAMOTIDINE SCH MG: 20 TABLET, FILM COATED ORAL at 19:59

## 2018-10-20 RX ADMIN — FERROUS GLUCONATE SCH MG: 324 TABLET ORAL at 08:53

## 2018-10-20 RX ADMIN — LISINOPRIL SCH MG: 20 TABLET ORAL at 08:52

## 2018-10-20 RX ADMIN — FAMOTIDINE SCH MG: 20 TABLET, FILM COATED ORAL at 08:52

## 2018-10-20 RX ADMIN — SODIUM CHLORIDE, PRESERVATIVE FREE SCH: 5 INJECTION INTRAVENOUS at 07:33

## 2018-10-20 RX ADMIN — POLYETHYLENE GLYCOL 3350 SCH: 17 POWDER, FOR SOLUTION ORAL at 07:32

## 2018-10-20 RX ADMIN — SODIUM CHLORIDE, PRESERVATIVE FREE SCH: 5 INJECTION INTRAVENOUS at 15:39

## 2018-10-20 NOTE — PROVIDER PROGRESS NOTE
Assessment/Plan





- Problem List


(1) Epistaxis


Assessment/Plan: 


The anterior rhinopak fell out yesterday. L posterio cathy remains in place.


H/H are stable. No transfusion needed.


Fe Gluconate was started yesterday.


She will need ENT F/U as outpt.








(2) Syncope


Qualifiers: 


   Syncope type: unspecified   Qualified Code(s): R55 - Syncope and collapse   


Assessment/Plan: 


No significant orthostasis.


Echo essentially normal.


No dysrhythmias on telemetry.


It appears she had a vasovagal cause of her syncope (but she was not on 

telemetry at the moment of syncope in the ER, to document a bradycardic spell).








(3) Hypertension


Qualifiers: 


   Hypertension type: essential hypertension   Qualified Code(s): I10 - 

Essential (primary) hypertension   


Assessment/Plan: 


BP improved on meds.


Will adjust diet to low salt.








(4) Depression


Qualifiers: 


   Depression Type: unspecified   Qualified Code(s): F32.9 - Major depressive 

disorder, single episode, unspecified   


Assessment/Plan: 


Continue home antidepressant








(5) Hyperlipidemia


Qualifiers: 


   Hyperlipidemia type: mixed hyperlipidemia   Qualified Code(s): E78.2 - Mixed 

hyperlipidemia   


Assessment/Plan: 


LDL is 102, on a statin.


Triglycerides are 177, despite no DM, but her glu have been elevated since 

admission.


Will start a low-carb diet.


I discussed recommendations for a low-carb, low fat, high HDL diet with Pt and 

family in room.


Hypertriglyceridemia may also need outpt management with Fibrates.








- Current Meds


Current Meds: 





                               Current Medications











Generic Name Dose Route Start Last Admin





  Trade Name [place]  PRN Reason Stop Dose Admin


 


Acetaminophen  650 mg  10/19/18 00:15  10/19/18 16:03





  Tylenol  PO   650 mg





  Q4HR PRN   Administration





  Pain 1 to 4   





     





     





     


 


Atorvastatin Calcium  10 mg  10/19/18 09:00  10/20/18 08:52





  Lipitor  PO   10 mg





  DAILY EUGENIO   Administration





     





     





     





     


 


Famotidine  20 mg  10/19/18 09:00  10/20/18 08:52





  Pepcid  PO   20 mg





  BID EUGENIO   Administration





     





     





     





     


 


Ferrous Gluconate  324 mg  10/19/18 21:00  10/20/18 08:53





  Fergon  PO   324 mg





  BID EUGENIO   Administration





     





     





     





     


 


Sodium Chloride  500 mls @ 30 mls/hr  10/19/18 11:37  10/19/18 14:58





  Normal Saline 0.9%  IV   Not Given





  .[id number] EUGENIO   





     





     





     





     


 


Lisinopril  40 mg  10/19/18 09:00  10/20/18 08:52





  Zestril  PO   40 mg





  DAILY EUGENIO   Administration





     





     





     





     


 


Oxycodone HCl  5 mg  10/19/18 00:15  10/19/18 22:41





  Roxicodone  PO   5 mg





  Q4HR PRN   Administration





  Pain 5 to 7   





     





     





     


 


Polyethylene Glycol  17 gm  10/19/18 09:00  10/20/18 07:32





  Miralax  PO   Not Given





  DAILY EUGENIO   





     





     





     





     


 


Sodium Chloride  10 ml  10/19/18 01:00  10/20/18 07:33





  Normal Saline Flush 0.9%  IVP   Not Given





  0100,0900,1700 EUGENIO   





     





     





     





     


 


Trazodone HCl  100 mg  10/19/18 04:00  10/19/18 20:53





  Desyrel  PO   100 mg





  HS EUGENIO   Administration





     





     





     





     














- Lab Result


Fish Bone Diagrams: 


                                 10/20/18 06:06





                                 10/19/18 05:35





- Additional Planning


My Orders: 





My Active Orders





10/19/18 21:00


Ferrous Gluconate [Fergon]   324 mg PO BID 





10/20/18 Dinner


DIET [Carb-controlled Diet] [DIET] 





10/21/18 05:00


CBC - COMP BLD CT W/AUTO DIFF [HEME] DAILYLAB 














Subjective





- Subjective


Patient Reports: Feeling Better


Nursing Reports: Other (Suctioning blood clots and mucous from throat and 

nasopharynx)





Objective


Vital Signs: 





                               Vital Signs - 24 hr











  10/19/18 10/19/18 10/19/18





  15:34 16:26 18:00


 


Temperature   


 


Heart Rate [ 111 H  





Brachial]   


 


Heart Rate [   80





Sitting (After   





1 Minute)]   


 


Heart Rate [   





Standing (After   





1 Minute)]   


 


Heart Rate [   





Supine]   


 


Respiratory 20  





Rate   


 


Blood Pressure  145/81 H 


 


Blood Pressure 145/81 H  





[Left Brachial   





artery]   


 


Blood Pressure   117/64





[Sitting (After   





1 Minute)]   


 


Blood Pressure   123/54 L





[Standing (   





After 1 Minute)   





]   


 


Blood Pressure   





[Supine]   


 


O2 Saturation 93  














  10/19/18 10/19/18 10/20/18





  20:23 23:40 06:25


 


Temperature 36.7 C 36.4 C L 36.7 C


 


Heart Rate [ 80 79 88





Brachial]   


 


Heart Rate [   95





Sitting (After   





1 Minute)]   


 


Heart Rate [   86





Standing (After   





1 Minute)]   


 


Heart Rate [   88





Supine]   


 


Respiratory 18 16 18





Rate   


 


Blood Pressure   


 


Blood Pressure 117/64 112/54 L 138/47 H





[Left Brachial   





artery]   


 


Blood Pressure   152/72 H





[Sitting (After   





1 Minute)]   


 


Blood Pressure   145/112 H





[Standing (   





After 1 Minute)   





]   


 


Blood Pressure   138/47 H





[Supine]   


 


O2 Saturation 95 94 95














  10/20/18 10/20/18 10/20/18





  08:11 08:12 13:15


 


Temperature 36.8 C  36.1 C L


 


Heart Rate [ 85  100





Brachial]   


 


Heart Rate [  88 





Sitting (After   





1 Minute)]   


 


Heart Rate [  90 





Standing (After   





1 Minute)]   


 


Heart Rate [  85 





Supine]   


 


Respiratory 20  19





Rate   


 


Blood Pressure   


 


Blood Pressure 132/61 H  133/66 H





[Left Brachial   





artery]   


 


Blood Pressure  121/66 





[Sitting (After   





1 Minute)]   


 


Blood Pressure  124/74 





[Standing (   





After 1 Minute)   





]   


 


Blood Pressure  132/61 H 





[Supine]   


 


O2 Saturation 93  95








                                     Oxygen











O2 Source                      Room air














I&O (Last 24 Hrs): 





                          Intake and Output Totals x24h











 10/18/18 10/19/18 10/20/18





 23:59 23:59 23:59


 


Intake Total 110 3290 715


 


Balance 110 3290 715











General: Alert, Oriented x3, No acute distress


HEENT: Other (L nares is packed, pack is bloody)


Neck: Supple, No JVD


Neuro: Non Focal


Cardiovascular: Regular rate


Respiratory: No respiratory distress


Abdomen: Soft


Extremities: No edema





- Results


Results: 





                               Laboratory Results











WBC  11.4 x10^3/uL (4.8-10.8)  H  10/20/18  06:06    


 


RBC  3.79 10^6/uL (4.20-5.40)  L  10/20/18  06:06    


 


Hgb  10.7 g/dL (12.0-16.0)  L  10/20/18  06:06    


 


Hct  32.1 % (37.0-47.0)  L  10/20/18  06:06    


 


MCV  84.7 fL (81.0-99.0)   10/20/18  06:06    


 


MCH  28.1 pg (27.0-31.0)   10/20/18  06:06    


 


MCHC  33.2 g/dL (32.0-36.0)   10/20/18  06:06    


 


RDW  15.4 % (12.0-15.0)  H  10/20/18  06:06    


 


Plt Count  207 10^3/uL (130-450)   10/20/18  06:06    


 


MPV  7.0 fL (7.9-10.8)  L  10/20/18  06:06    


 


Neut # (Auto)  7.7 10^3/uL (1.5-6.6)  H  10/20/18  06:06    


 


Lymph # (Auto)  2.4 10^3/uL (1.5-3.5)   10/20/18  06:06    


 


Mono # (Auto)  0.8 10^3/uL (0.0-1.0)   10/20/18  06:06    


 


Eos # (Auto)  0.4 10^3/uL (0.0-0.7)   10/20/18  06:06    


 


Baso # (Auto)  0.1 10^3/uL (0.0-0.1)   10/20/18  06:06    


 


Absolute Nucleated RBC  0.01 x10^3/uL  10/20/18  06:06    


 


Nucleated RBC %  0.0 /100WBC  10/20/18  06:06    


 


PT  13.0 secs (9.9-12.6)  H  10/19/18  05:35    


 


INR  1.2  (0.8-1.2)   10/19/18  05:35    


 


APTT  26.7 secs (24.9-33.3)   10/18/18  19:55    


 


Sodium  141 mmol/L (135-145)   10/19/18  05:35    


 


Potassium  3.6 mmol/L (3.5-5.0)   10/19/18  05:35    


 


Chloride  109 mmol/L (101-111)   10/19/18  05:35    


 


Carbon Dioxide  25 mmol/L (21-32)   10/19/18  05:35    


 


Anion Gap  7.0  (6-13)   10/19/18  05:35    


 


BUN  29 mg/dL (6-20)  H  10/19/18  05:35    


 


Creatinine  0.6 mg/dL (0.4-1.0)   10/19/18  05:35    


 


Estimated GFR (MDRD)  100  (>89)   10/19/18  05:35    


 


Glucose  126 mg/dL ()  H  10/19/18  05:35    


 


Glycated Hemoglobin  5.6 % (4.6-6.2)   10/19/18  05:35    


 


Estim Average Glucose  114  ()  H  10/19/18  05:35    


 


Lactic Acid  0.7 mmol/L (0.5-2.2)   10/19/18  05:35    


 


Calcium  8.7 mg/dL (8.5-10.3)   10/19/18  05:35    


 


Phosphorus  3.3 mg/dL (2.5-4.6)   10/19/18  05:35    


 


Magnesium  1.8 mg/dL (1.7-2.8)   10/19/18  05:35    


 


Total Bilirubin  0.4 mg/dL (0.2-1.0)   10/19/18  05:35    


 


AST  16 IU/L (10-42)   10/19/18  05:35    


 


ALT  19 IU/L (10-60)   10/19/18  05:35    


 


Alkaline Phosphatase  92 IU/L ()   10/19/18  05:35    


 


Troponin I  < 0.04 ng/mL (<0.49)   10/19/18  05:35    


 


B-Natriuretic Peptide  16 pg/mL (5-100)   10/19/18  05:35    


 


Total Protein  6.5 g/dL (6.7-8.2)  L  10/19/18  05:35    


 


Albumin  3.1 g/dL (3.2-5.5)  L  10/19/18  05:35    


 


Globulin  3.4 g/dL (2.1-4.2)   10/19/18  05:35    


 


Albumin/Globulin Ratio  0.9  (1.0-2.2)  L  10/19/18  05:35    


 


Triglycerides  177 mg/dL (-149) H  10/20/18  06:06    


 


Cholesterol  169 mg/dL (-199)  10/20/18  06:06    


 


LDL Cholesterol, Calc  102 mg/dL (-129)  10/20/18  06:06    


 


VLDL Cholesterol  35 mg/dL  10/20/18  06:06    


 


HDL Cholesterol  32 mg/dL (60-) L  10/20/18  06:06    


 


LDL/HDL Ratio  3.2  (<4.4)   10/20/18  06:06    


 


Cholesterol/HDL Ratio  5.3  (<4.4)   10/20/18  06:06    


 


Lipase  23 U/L (22-51)   10/18/18  23:30    


 


TSH  1.16 uIU/mL (0.34-5.60)   10/20/18  06:06

## 2018-10-21 LAB
BASOPHILS NFR BLD AUTO: 0.1 10^3/UL (ref 0–0.1)
BASOPHILS NFR BLD AUTO: 0.8 %
EOSINOPHIL # BLD AUTO: 0.3 10^3/UL (ref 0–0.7)
EOSINOPHIL NFR BLD AUTO: 2.5 %
ERYTHROCYTE [DISTWIDTH] IN BLOOD BY AUTOMATED COUNT: 15.1 % (ref 12–15)
HGB UR QL STRIP: 10.9 G/DL (ref 12–16)
LYMPHOCYTES # SPEC AUTO: 1.5 10^3/UL (ref 1.5–3.5)
LYMPHOCYTES NFR BLD AUTO: 14.1 %
MCH RBC QN AUTO: 28.6 PG (ref 27–31)
MCHC RBC AUTO-ENTMCNC: 33.9 G/DL (ref 32–36)
MCV RBC AUTO: 84.2 FL (ref 81–99)
MONOCYTES # BLD AUTO: 1 10^3/UL (ref 0–1)
MONOCYTES NFR BLD AUTO: 8.9 %
NEUTROPHILS # BLD AUTO: 7.9 10^3/UL (ref 1.5–6.6)
NEUTROPHILS # SNV AUTO: 10.7 X10^3/UL (ref 4.8–10.8)
NEUTROPHILS NFR BLD AUTO: 73.7 %
PDW BLD AUTO: 7.2 FL (ref 7.9–10.8)
PLATELET # BLD: 202 10^3/UL (ref 130–450)
RBC MAR: 3.8 10^6/UL (ref 4.2–5.4)

## 2018-10-21 RX ADMIN — SODIUM CHLORIDE, PRESERVATIVE FREE SCH: 5 INJECTION INTRAVENOUS at 08:34

## 2018-10-21 RX ADMIN — LISINOPRIL SCH MG: 20 TABLET ORAL at 08:33

## 2018-10-21 RX ADMIN — ACETAMINOPHEN PRN MG: 325 TABLET ORAL at 09:07

## 2018-10-21 RX ADMIN — POLYETHYLENE GLYCOL 3350 SCH GM: 17 POWDER, FOR SOLUTION ORAL at 08:34

## 2018-10-21 RX ADMIN — SODIUM CHLORIDE, PRESERVATIVE FREE SCH: 5 INJECTION INTRAVENOUS at 15:44

## 2018-10-21 RX ADMIN — FERROUS GLUCONATE SCH MG: 324 TABLET ORAL at 20:22

## 2018-10-21 RX ADMIN — FAMOTIDINE SCH MG: 20 TABLET, FILM COATED ORAL at 20:22

## 2018-10-21 RX ADMIN — FAMOTIDINE SCH MG: 20 TABLET, FILM COATED ORAL at 08:33

## 2018-10-21 RX ADMIN — ATORVASTATIN CALCIUM SCH MG: 10 TABLET, FILM COATED ORAL at 08:33

## 2018-10-21 RX ADMIN — OXYCODONE PRN MG: 5 TABLET ORAL at 16:09

## 2018-10-21 RX ADMIN — SODIUM CHLORIDE, PRESERVATIVE FREE SCH ML: 5 INJECTION INTRAVENOUS at 01:17

## 2018-10-21 RX ADMIN — FERROUS GLUCONATE SCH MG: 324 TABLET ORAL at 08:34

## 2018-10-21 NOTE — PROVIDER PROGRESS NOTE
Assessment/Plan





- Problem List


(1) Epistaxis


Assessment/Plan: 


No new complaints regarding the ant-posterior nasal pack on L side. The R sidwd 

anterior cathy fell out on its own.


Will monitor for several days due to posterior pack needing to be in place 3 

days.


No fever or headache.








(2) Syncope


Qualifiers: 


   Syncope type: unspecified   Qualified Code(s): R55 - Syncope and collapse   


Assessment/Plan: 


Vasovagal etiology as no other identifiable cause was found and the prodrome was

vagal.








(3) Hypertension


Qualifiers: 


   Hypertension type: essential hypertension   Qualified Code(s): I10 - 

Essential (primary) hypertension   


Assessment/Plan: 


Pt on her home meds








(4) Depression


Qualifiers: 


   Depression Type: unspecified   Qualified Code(s): F32.9 - Major depressive 

disorder, single episode, unspecified   


Assessment/Plan: 


Pt on her home meds








(5) Hyperlipidemia


Qualifiers: 


   Hyperlipidemia type: mixed hyperlipidemia   Qualified Code(s): E78.2 - Mixed 

hyperlipidemia   


Assessment/Plan: 


Pt on her home statin med.


We previously discussed need for better triglyceride control, starting with a 

low-carb diet, Pt is motivated to follow a stricter diet.








- Current Meds


Current Meds: 





                               Current Medications











Generic Name Dose Route Start Last Admin





  Trade Name Freq  PRN Reason Stop Dose Admin


 


Acetaminophen  650 mg  10/19/18 00:15  10/21/18 09:07





  Tylenol  PO   650 mg





  Q4HR PRN   Administration





  Pain 1 to 4   





     





     





     


 


Atorvastatin Calcium  10 mg  10/19/18 09:00  10/21/18 08:33





  Lipitor  PO   10 mg





  DAILY EUGENIO   Administration





     





     





     





     


 


Famotidine  20 mg  10/19/18 09:00  10/21/18 08:33





  Pepcid  PO   20 mg





  BID EUGENIO   Administration





     





     





     





     


 


Ferrous Gluconate  324 mg  10/19/18 21:00  10/21/18 08:34





  Fergon  PO   324 mg





  BID EUGENIO   Administration





     





     





     





     


 


Lisinopril  40 mg  10/19/18 09:00  10/21/18 08:33





  Zestril  PO   40 mg





  DAILY EUGENIO   Administration





     





     





     





     


 


Oxycodone HCl  5 mg  10/19/18 00:15  10/21/18 16:09





  Roxicodone  PO   5 mg





  Q4HR PRN   Administration





  Pain 5 to 7   





     





     





     


 


Polyethylene Glycol  17 gm  10/19/18 09:00  10/21/18 08:34





  Miralax  PO   17 gm





  DAILY EUGENIO   Administration





     





     





     





     


 


Sodium Chloride  10 ml  10/19/18 01:00  10/21/18 15:44





  Normal Saline Flush 0.9%  IVP   Not Given





  0100,0900,1700 EUGENIO   





     





     





     





     


 


Trazodone HCl  100 mg  10/19/18 04:00  10/20/18 19:59





  Desyrel  PO   100 mg





  HS EUGENIO   Administration





     





     





     





     














- Lab Result


Fish Bone Diagrams: 


                                 10/22/18 11:25





                                 10/19/18 05:35





Subjective





- Subjective


Patient Reports: Feeling Better


Nursing Reports: No Complaints





Objective


Vital Signs: 





                               Vital Signs - 24 hr











  10/20/18 10/20/18 10/21/18





  20:09 23:47 06:40


 


Temperature 36.9 C 36.7 C 37.0 C


 


Heart Rate [ 100 95 93





Brachial]   


 


Respiratory 20 20 18





Rate   


 


Blood Pressure 144/58 H 145/66 H 148/77 H





[Right Brachial   





artery]   


 


O2 Saturation 96 95 94














  10/21/18 10/21/18





  07:35 13:31


 


Temperature 36.8 C 36.7 C


 


Heart Rate [ 101 H 103 H





Brachial]  


 


Respiratory 16 18





Rate  


 


Blood Pressure 158/84 H 103/46 L





[Right Brachial  





artery]  


 


O2 Saturation 94 95








                                     Oxygen











O2 Source                      Room air














I&O (Last 24 Hrs): 





                          Intake and Output Totals x24h











 10/19/18 10/20/18 10/21/18





 23:59 23:59 23:59


 


Intake Total 3290 1135 650


 


Balance 3290 1135 650











General: Alert, Oriented x3


HEENT: Other (L pack in place)


Neck: Supple


Neuro: Non Focal


Cardiovascular: Regular rate, No murmurs


Respiratory: No respiratory distress, Breath sounds nml


Extremities: No edema





- Results


Results: 





                               Laboratory Results











WBC  10.7 x10^3/uL (4.8-10.8)   10/21/18  06:10    


 


RBC  3.80 10^6/uL (4.20-5.40)  L  10/21/18  06:10    


 


Hgb  10.9 g/dL (12.0-16.0)  L  10/21/18  06:10    


 


Hct  32.0 % (37.0-47.0)  L  10/21/18  06:10    


 


MCV  84.2 fL (81.0-99.0)   10/21/18  06:10    


 


MCH  28.6 pg (27.0-31.0)   10/21/18  06:10    


 


MCHC  33.9 g/dL (32.0-36.0)   10/21/18  06:10    


 


RDW  15.1 % (12.0-15.0)  H  10/21/18  06:10    


 


Plt Count  202 10^3/uL (130-450)   10/21/18  06:10    


 


MPV  7.2 fL (7.9-10.8)  L  10/21/18  06:10    


 


Neut # (Auto)  7.9 10^3/uL (1.5-6.6)  H  10/21/18  06:10    


 


Lymph # (Auto)  1.5 10^3/uL (1.5-3.5)   10/21/18  06:10    


 


Mono # (Auto)  1.0 10^3/uL (0.0-1.0)   10/21/18  06:10    


 


Eos # (Auto)  0.3 10^3/uL (0.0-0.7)   10/21/18  06:10    


 


Baso # (Auto)  0.1 10^3/uL (0.0-0.1)   10/21/18  06:10    


 


Absolute Nucleated RBC  0.01 x10^3/uL  10/21/18  06:10    


 


Nucleated RBC %  0.1 /100WBC  10/21/18  06:10    


 


PT  13.0 secs (9.9-12.6)  H  10/19/18  05:35    


 


INR  1.2  (0.8-1.2)   10/19/18  05:35    


 


APTT  26.7 secs (24.9-33.3)   10/18/18  19:55    


 


Sodium  141 mmol/L (135-145)   10/19/18  05:35    


 


Potassium  3.6 mmol/L (3.5-5.0)   10/19/18  05:35    


 


Chloride  109 mmol/L (101-111)   10/19/18  05:35    


 


Carbon Dioxide  25 mmol/L (21-32)   10/19/18  05:35    


 


Anion Gap  7.0  (6-13)   10/19/18  05:35    


 


BUN  29 mg/dL (6-20)  H  10/19/18  05:35    


 


Creatinine  0.6 mg/dL (0.4-1.0)   10/19/18  05:35    


 


Estimated GFR (MDRD)  100  (>89)   10/19/18  05:35    


 


Glucose  126 mg/dL ()  H  10/19/18  05:35    


 


Glycated Hemoglobin  5.6 % (4.6-6.2)   10/19/18  05:35    


 


Estim Average Glucose  114  ()  H  10/19/18  05:35    


 


Lactic Acid  0.7 mmol/L (0.5-2.2)   10/19/18  05:35    


 


Calcium  8.7 mg/dL (8.5-10.3)   10/19/18  05:35    


 


Phosphorus  3.3 mg/dL (2.5-4.6)   10/19/18  05:35    


 


Magnesium  1.8 mg/dL (1.7-2.8)   10/19/18  05:35    


 


Total Bilirubin  0.4 mg/dL (0.2-1.0)   10/19/18  05:35    


 


AST  16 IU/L (10-42)   10/19/18  05:35    


 


ALT  19 IU/L (10-60)   10/19/18  05:35    


 


Alkaline Phosphatase  92 IU/L ()   10/19/18  05:35    


 


Troponin I  < 0.04 ng/mL (<0.49)   10/19/18  05:35    


 


B-Natriuretic Peptide  16 pg/mL (5-100)   10/19/18  05:35    


 


Total Protein  6.5 g/dL (6.7-8.2)  L  10/19/18  05:35    


 


Albumin  3.1 g/dL (3.2-5.5)  L  10/19/18  05:35    


 


Globulin  3.4 g/dL (2.1-4.2)   10/19/18  05:35    


 


Albumin/Globulin Ratio  0.9  (1.0-2.2)  L  10/19/18  05:35    


 


Triglycerides  177 mg/dL (-149) H  10/20/18  06:06    


 


Cholesterol  169 mg/dL (-199)  10/20/18  06:06    


 


LDL Cholesterol, Calc  102 mg/dL (-129)  10/20/18  06:06    


 


VLDL Cholesterol  35 mg/dL  10/20/18  06:06    


 


HDL Cholesterol  32 mg/dL (60-) L  10/20/18  06:06    


 


LDL/HDL Ratio  3.2  (<4.4)   10/20/18  06:06    


 


Cholesterol/HDL Ratio  5.3  (<4.4)   10/20/18  06:06    


 


Lipase  23 U/L (22-51)   10/18/18  23:30    


 


TSH  1.16 uIU/mL (0.34-5.60)   10/20/18  06:06    














ABX Reporting


Has patient been on IV antibiotics over the past 48 hours?: No

## 2018-10-22 VITALS — SYSTOLIC BLOOD PRESSURE: 111 MMHG | DIASTOLIC BLOOD PRESSURE: 61 MMHG

## 2018-10-22 LAB
BASOPHILS NFR BLD AUTO: 0.1 10^3/UL (ref 0–0.1)
BASOPHILS NFR BLD AUTO: 0.8 %
EOSINOPHIL # BLD AUTO: 0.3 10^3/UL (ref 0–0.7)
EOSINOPHIL NFR BLD AUTO: 2.9 %
ERYTHROCYTE [DISTWIDTH] IN BLOOD BY AUTOMATED COUNT: 15.2 % (ref 12–15)
HGB UR QL STRIP: 10.6 G/DL (ref 12–16)
LYMPHOCYTES # SPEC AUTO: 1.7 10^3/UL (ref 1.5–3.5)
LYMPHOCYTES NFR BLD AUTO: 14.5 %
MCH RBC QN AUTO: 28.6 PG (ref 27–31)
MCHC RBC AUTO-ENTMCNC: 34.3 G/DL (ref 32–36)
MCV RBC AUTO: 83.5 FL (ref 81–99)
MONOCYTES # BLD AUTO: 1.2 10^3/UL (ref 0–1)
MONOCYTES NFR BLD AUTO: 10.5 %
NEUTROPHILS # BLD AUTO: 8.2 10^3/UL (ref 1.5–6.6)
NEUTROPHILS # SNV AUTO: 11.5 X10^3/UL (ref 4.8–10.8)
NEUTROPHILS NFR BLD AUTO: 71.3 %
PDW BLD AUTO: 7.1 FL (ref 7.9–10.8)
PLATELET # BLD: 242 10^3/UL (ref 130–450)
RBC MAR: 3.69 10^6/UL (ref 4.2–5.4)

## 2018-10-22 RX ADMIN — POLYETHYLENE GLYCOL 3350 SCH: 17 POWDER, FOR SOLUTION ORAL at 08:34

## 2018-10-22 RX ADMIN — ACETAMINOPHEN PRN MG: 325 TABLET ORAL at 12:36

## 2018-10-22 RX ADMIN — FERROUS GLUCONATE SCH MG: 324 TABLET ORAL at 08:34

## 2018-10-22 RX ADMIN — SODIUM CHLORIDE, PRESERVATIVE FREE SCH: 5 INJECTION INTRAVENOUS at 01:20

## 2018-10-22 RX ADMIN — ATORVASTATIN CALCIUM SCH MG: 10 TABLET, FILM COATED ORAL at 08:34

## 2018-10-22 RX ADMIN — FAMOTIDINE SCH MG: 20 TABLET, FILM COATED ORAL at 08:34

## 2018-10-22 RX ADMIN — ACETAMINOPHEN PRN MG: 325 TABLET ORAL at 08:35

## 2018-10-22 RX ADMIN — LISINOPRIL SCH MG: 20 TABLET ORAL at 08:34

## 2018-10-22 RX ADMIN — SODIUM CHLORIDE, PRESERVATIVE FREE SCH: 5 INJECTION INTRAVENOUS at 08:34

## 2018-10-22 NOTE — DISCHARGE PLAN
Discharge Plan


Disposition: 01 Home, Self Care


Condition: Stable


Prescriptions: 


Ferrous Gluconate 324 mg PO BID 30 Days #60 tablet


Diet: Diabetic


Activity Restrictions: Activity as Tolerated


Shower Restrictions: No


Driving Restrictions: No


Instruction Topics:  ED Nasal Packing Anterior Removable, Nosebleeds Ch


Additional Instructions or Follow Up instructions: 





You were admitted after a nosebleed required you to have nasal packing. Since 

you get recurrent nosebleeds, you should have evaluation by an ENT specialist, 

after getting a referral from your PCP.





Your hemoglobin dropped due to the bleeding. You did not require a blood 

transfusion, but you have been put on Iron replacement. Please start taking the 

new prescription for Iron which was ordered. Iron pills can cause constipation, 

therefore drink fluids and eat high fiber foods to help with constipation. See 

you PCP in follow-up for a blood test and office visit in the next week.





Your blood tests showed that you may be pre-diabetic. You should talk to your 

PCP about this and stay on a stricter low-sugar, low-starch diet, which will 

help you lose weight too.





Resume all your pre-hospital medications.





If you have new or worsening symptoms, come to the ER.





No Smoking: If you smoke, Please STOP!  Call [phone] for help.


Follow-up with: 


CHRISTINE BARRY [Primary Care Provider] -

## 2018-10-29 NOTE — DISCHARGE SUMMARY
Physician: Kandace Lee MD

DATE OF ADMISSION: 10/19/2018

DATE OF DISCHARGE:  10/22/2018

 

HISTORY OF PRESENT ILLNESS:  This is a 68-year-old, white female with a history of obesity, hypertens
ion, asthma, hyperlipidemia, GERD, DJD, depression, who has had several nosebleeds in the past.  The 
patient presented with a severe nosebleed that was not stopped for hours.  She presented to the [facility]
ency room.  She denied any headaches, trauma, has never had a blood disorder.  The patient required a
 nasal packing because of the severe epistaxis.  The right side got an anterior pack, the left side g
ot an anterior and posterior pack.  She was admitted for management of this.  In the emergency room, 
she also had a sudden episode of dizziness, nausea and vomiting of clots of blood, and then her head 
tilted back and she became unresponsive.  She was not on telemetry at this time.  A nurse came in and
 did a sternal rub, and she woke up in about a minute and a half.  She had blood tests done at this [facility], which revealed hypokalemia, negative troponin, a small drop in her hemoglobin, and she was ther
efore admitted for management of all the above.

 

HOSPITAL COURSE AND DISCHARGE DIAGNOSES

1.  Epistaxis.  The patient had no further nosebleeds.  The nose packs were in place for 2-3 days.  T
he anterior pack on the right side fell out after day 2, the left side fell out after day 3.  She was
 monitored for approximately 1 hour after the left-sided pack spontaneously came out and there were n
o further episodes of epistaxis, and she was discharged home.  She was advised to see her PCP for a r
eferral to an ENT because of the recurrent nosebleeds.

2.  Syncope.  The patient was on telemetry and had no episodes of dysrhythmias.  She had troponins x3
, which were normal.  An echo was done that showed normal LV size and function, EF 65% to 70%, normal
 RV size and function.  Because her prodrome was that of vomiting, it was felt that her episode was v
asovagal.

3.  Hypertension.  The patient was kept on her blood pressure medicines while here.

4.  Depression.  The patient was kept on her medicines while here.

5.  Hyperlipidemia.  The patient had blood tests done that showed a total cholesterol of 169, LDL of 
102, triglycerides 177 and HDL of 32.  I reviewed better diet restrictions for the patient to decreas
e her triglycerides and increase her HDL, and the patient appeared motivated to follow these recommen
dations.

 

LABORATORIES AND IMAGING:  Reviewed and summarized above.

 

ALLERGIES:  SHRIMP.

 

MEDICATIONS AT THE TIME OF DISCHARGE

1.  Baby aspirin daily.

2.  Vitamin D3 50,000 units, unknown frequency.

3.  Lisinopril 40 mg daily.

4.  Omeprazole 20 mg b.i.d.

5.  Simvastatin 20 mg every night.

6.  Desyrel 100 mg every night.

7.  Iron gluconate was started 324 mg p.o. b.i.d.

 

CONDITION AT DISCHARGE:  Stable.

 

PHYSICAL EXAMINATION

VITAL SIGNS:  Blood pressure 111/61, pulse of 88.

HEENT:  Unremarkable.

NECK:  Without JVD or carotid bruits.

CHEST:  Clear.

HEART:  Sounds normal.

ABDOMEN:  Soft, obese.  No organomegaly.

EXTREMITIES:  No clubbing, cyanosis, or edema.

NEUROLOGIC:  Intact.

 

FOLLOWUP:  With her PCP in the next 1 week and referral to an ENT is advised.

 

CODE STATUS:  FULL CODE.

 

TIME REQUIRED TO COMPLETE THIS ENTIRE DISCHARGE, CHART REVIEW, PATIENT EDUCATION, DICTATION:  Thirty 
minutes.

 

 

cc: Bi Fitzgerald

DD: 10/29/2018 01:34

TD: 10/29/2018 01:43

Job #: [id number]

## 2021-04-14 NOTE — ULTRASOUND REPORT
Reason:  Syncope

Procedure Date:  10/19/2018   

Accession Number:  [id number] / [id number]                    

Procedure:  US  - Carotid Doppler Complete CPT Code:  

 

FULL RESULT:

 

 

EXAM:

BILATERAL CAROTID AND VERTEBRAL ARTERY DUPLEX DOPPLER ULTRASOUND:

 

EXAM DATE: 10/19/2018 01:22 AM

 

CLINICAL HISTORY: Syncope.

 

COMPARISON: None.

 

TECHNIQUE: Grayscale imaging, color Doppler, and duplex spectral Doppler 

were used to evaluate the carotid and vertebral arteries bilaterally. 

Static images were obtained.

 

FINDINGS:  Mild smooth plaque is seen in the right internal carotid 

artery. Moderate calcified plaque is seen in the left internal carotid 

artery. Normal antegrade flow is present in bilateral vertebral arteries.

 

VELOCITIES (cm/sec):

 

Right

CCA mid: .8 cm/sec

CCA dist: PSV 52.3 cm/sec

ICA prox: PSV 90.3 cm/sec, EDV 26.2 cm/sec

ICA mid: PSV 75.9 cm/sec, EDV 22.9 cm/sec

ICA dist: PSV 41.9 cm/sec, EDV 14.7 cm/sec

ECA:  cm/sec

Vert: PSV 58.2 cm/sec

ICA/CCA: 1.7

 

Left

CCA mid: PSV 99.5 cm/sec

CCA dist:  cm/sec

ICA prox: PSV 92.9 cm/sec, EDV 35.2 cm/sec

ICA mid: .7 cm/sec, EDV 32.8 cm/sec

ICA dist: PSV 93.5 cm/sec, EDV 11.4 cm/sec

ECA: .6 cm/sec

Vert: PSV 77.9 cm/sec

ICA/CCA: 0.9

 

ICA diameter stenosis:

Right: <50% by velocity and <70% by NASCET criteria.

Left: 50-69% by velocity.

IMPRESSION:

1. There is bilateral carotid artery plaquing, left greater than right.

2. In the right carotid artery there are no elevated carotid artery 

velocities to suggest hemodynamically significant stenosis.

3. Mildly elevated velocity in the left mid internal carotid artery 

suggesting 50-69% stenosis.

4. Normal antegrade flow is present in bilateral vertebral arteries.

 

General Recommendations:

 

Stenosis =50% ICA - Follow-up ultrasound 6-12 months

Stenosis <50% ICA - High Risk Patient with plaque - Follow-up ultrasound 

1-2 years

Normal Study but High Risk Patient - Follow-up ultrasound 3-5 years

 

Management recommendations and diagnostic criteria are based on current 

IAC endorsed standards in Carotid Artery Stenosis: Grayscale and Doppler 

Ultrasound Diagnosis.

Validated velocity measurements with angiographic measurements and 

velocity criteria are extrapolated from diameter data as defined by the 

Society of Radiologists in Ultrasound Consensus Conference Radiology 

2003; 229;340-346.

 

RADIA yes